# Patient Record
Sex: FEMALE | Race: WHITE
[De-identification: names, ages, dates, MRNs, and addresses within clinical notes are randomized per-mention and may not be internally consistent; named-entity substitution may affect disease eponyms.]

---

## 2018-06-13 ENCOUNTER — HOSPITAL ENCOUNTER (EMERGENCY)
Dept: HOSPITAL 87 - ER | Age: 23
Discharge: LEFT BEFORE DISCHARGE | End: 2018-06-13
Payer: SELF-PAY

## 2018-06-13 VITALS — HEIGHT: 62 IN | WEIGHT: 141.1 LBS | BODY MASS INDEX: 25.96 KG/M2

## 2018-06-13 VITALS — SYSTOLIC BLOOD PRESSURE: 120 MMHG | DIASTOLIC BLOOD PRESSURE: 77 MMHG

## 2018-06-13 DIAGNOSIS — Z88.0: ICD-10-CM

## 2018-06-13 DIAGNOSIS — F17.200: ICD-10-CM

## 2018-06-13 DIAGNOSIS — Z53.21: Primary | ICD-10-CM

## 2018-07-16 ENCOUNTER — HOSPITAL ENCOUNTER (EMERGENCY)
Dept: HOSPITAL 72 - EMR | Age: 23
Discharge: HOME | End: 2018-07-16
Payer: SELF-PAY

## 2018-07-16 VITALS — WEIGHT: 135 LBS | BODY MASS INDEX: 24.84 KG/M2 | HEIGHT: 62 IN

## 2018-07-16 VITALS — SYSTOLIC BLOOD PRESSURE: 143 MMHG | DIASTOLIC BLOOD PRESSURE: 99 MMHG

## 2018-07-16 DIAGNOSIS — Z23: ICD-10-CM
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
## 2018-07-16 DIAGNOSIS — S41.011A: Primary | ICD-10-CM

## 2018-07-16 DIAGNOSIS — S21.119A: ICD-10-CM

## 2018-07-16 DIAGNOSIS — S51.012A: ICD-10-CM
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
## 2018-07-16 DIAGNOSIS — S81.812A: ICD-10-CM

## 2018-07-16 DIAGNOSIS — Z88.0: ICD-10-CM

## 2018-07-16 DIAGNOSIS — S11.91XA: ICD-10-CM

## 2018-07-16 PROCEDURE — 90715 TDAP VACCINE 7 YRS/> IM: CPT

## 2018-07-16 PROCEDURE — 99284 EMERGENCY DEPT VISIT MOD MDM: CPT

## 2018-07-16 PROCEDURE — 90471 IMMUNIZATION ADMIN: CPT
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
## 2018-07-23 ENCOUNTER — HOSPITAL ENCOUNTER (EMERGENCY)
Dept: HOSPITAL 72 - EMR | Age: 23
LOS: 1 days | Discharge: HOME | End: 2018-07-24
Payer: SELF-PAY

## 2018-07-23 VITALS — HEIGHT: 62 IN | BODY MASS INDEX: 24.84 KG/M2 | WEIGHT: 135 LBS

## 2018-07-23 VITALS — DIASTOLIC BLOOD PRESSURE: 75 MMHG | SYSTOLIC BLOOD PRESSURE: 120 MMHG

## 2018-07-23 DIAGNOSIS — Z48.02: ICD-10-CM

## 2018-07-23 DIAGNOSIS — Z88.0: ICD-10-CM

## 2018-07-23 DIAGNOSIS — N39.0: Primary | ICD-10-CM

## 2018-07-23 LAB
APPEARANCE UR: CLEAR
APTT PPP: (no result) S
GLUCOSE UR STRIP-MCNC: NEGATIVE MG/DL
KETONES UR QL STRIP: NEGATIVE
LEUKOCYTE ESTERASE UR QL STRIP: (no result)
NITRITE UR QL STRIP: NEGATIVE
PH UR STRIP: 8 [PH] (ref 4.5–8)
PROT UR QL STRIP: (no result)
SP GR UR STRIP: 1.01 (ref 1–1.03)
UROBILINOGEN UR-MCNC: 1 MG/DL (ref 0–1)

## 2018-07-23 PROCEDURE — 81025 URINE PREGNANCY TEST: CPT

## 2018-07-23 PROCEDURE — 81003 URINALYSIS AUTO W/O SCOPE: CPT

## 2018-07-23 PROCEDURE — 87181 SC STD AGAR DILUTION PER AGT: CPT

## 2018-07-23 PROCEDURE — 99283 EMERGENCY DEPT VISIT LOW MDM: CPT

## 2018-07-23 PROCEDURE — 87086 URINE CULTURE/COLONY COUNT: CPT
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
## 2018-07-24 VITALS — SYSTOLIC BLOOD PRESSURE: 118 MMHG | DIASTOLIC BLOOD PRESSURE: 60 MMHG

## 2018-08-12 ENCOUNTER — HOSPITAL ENCOUNTER (EMERGENCY)
Dept: HOSPITAL 72 - EMR | Age: 23
Discharge: HOME | End: 2018-08-12
Payer: SELF-PAY

## 2018-08-12 VITALS — HEIGHT: 62 IN | WEIGHT: 135 LBS | BODY MASS INDEX: 24.84 KG/M2

## 2018-08-12 VITALS — DIASTOLIC BLOOD PRESSURE: 84 MMHG | SYSTOLIC BLOOD PRESSURE: 134 MMHG

## 2018-08-12 DIAGNOSIS — Z88.0: ICD-10-CM

## 2018-08-12 DIAGNOSIS — T76.21XA: Primary | ICD-10-CM

## 2018-08-12 DIAGNOSIS — Z88.1: ICD-10-CM

## 2018-08-12 DIAGNOSIS — A64: ICD-10-CM

## 2018-08-12 LAB
ADD MANUAL DIFF: NO
ALBUMIN SERPL-MCNC: 4.9 G/DL (ref 3.4–5)
ALBUMIN/GLOB SERPL: 1.2 {RATIO} (ref 1–2.7)
ALP SERPL-CCNC: 80 U/L (ref 46–116)
ALT SERPL-CCNC: 18 U/L (ref 12–78)
ANION GAP SERPL CALC-SCNC: 14 MMOL/L (ref 5–15)
APPEARANCE UR: CLEAR
APTT PPP: YELLOW S
AST SERPL-CCNC: 18 U/L (ref 15–37)
BASOPHILS NFR BLD AUTO: 1.4 % (ref 0–2)
BILIRUB DIRECT SERPL-MCNC: 0.4 MG/DL (ref 0–0.3)
BILIRUB SERPL-MCNC: 2.8 MG/DL (ref 0.2–1)
BUN SERPL-MCNC: 10 MG/DL (ref 7–18)
CALCIUM SERPL-MCNC: 10.5 MG/DL (ref 8.5–10.1)
CHLORIDE SERPL-SCNC: 100 MMOL/L (ref 98–107)
CO2 SERPL-SCNC: 27 MMOL/L (ref 21–32)
CREAT SERPL-MCNC: 1 MG/DL (ref 0.55–1.3)
EOSINOPHIL NFR BLD AUTO: 1.4 % (ref 0–3)
ERYTHROCYTE [DISTWIDTH] IN BLOOD BY AUTOMATED COUNT: 11.5 % (ref 11.6–14.8)
GLOBULIN SER-MCNC: 4 G/DL
GLUCOSE UR STRIP-MCNC: NEGATIVE MG/DL
HCT VFR BLD CALC: 46.6 % (ref 37–47)
HGB BLD-MCNC: 16.2 G/DL (ref 12–16)
KETONES UR QL STRIP: NEGATIVE
LEUKOCYTE ESTERASE UR QL STRIP: (no result)
LYMPHOCYTES NFR BLD AUTO: 39.6 % (ref 20–45)
MCV RBC AUTO: 92 FL (ref 80–99)
MONOCYTES NFR BLD AUTO: 8 % (ref 1–10)
NEUTROPHILS NFR BLD AUTO: 49.7 % (ref 45–75)
NITRITE UR QL STRIP: NEGATIVE
PH UR STRIP: 7 [PH] (ref 4.5–8)
PLATELET # BLD: 353 K/UL (ref 150–450)
POTASSIUM SERPL-SCNC: 3.8 MMOL/L (ref 3.5–5.1)
PROT UR QL STRIP: (no result)
RBC # BLD AUTO: 5.09 M/UL (ref 4.2–5.4)
SODIUM SERPL-SCNC: 141 MMOL/L (ref 136–145)
SP GR UR STRIP: 1.01 (ref 1–1.03)
UROBILINOGEN UR-MCNC: 8 MG/DL (ref 0–1)
WBC # BLD AUTO: 8.9 K/UL (ref 4.8–10.8)

## 2018-08-12 PROCEDURE — 81025 URINE PREGNANCY TEST: CPT

## 2018-08-12 PROCEDURE — 81003 URINALYSIS AUTO W/O SCOPE: CPT

## 2018-08-12 PROCEDURE — 83690 ASSAY OF LIPASE: CPT

## 2018-08-12 PROCEDURE — 82248 BILIRUBIN DIRECT: CPT

## 2018-08-12 PROCEDURE — 36415 COLL VENOUS BLD VENIPUNCTURE: CPT

## 2018-08-12 PROCEDURE — 85025 COMPLETE CBC W/AUTO DIFF WBC: CPT

## 2018-08-12 PROCEDURE — 99284 EMERGENCY DEPT VISIT MOD MDM: CPT

## 2018-08-12 PROCEDURE — 80053 COMPREHEN METABOLIC PANEL: CPT

## 2018-08-12 PROCEDURE — 70450 CT HEAD/BRAIN W/O DYE: CPT
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
## 2018-08-12 NOTE — DIAGNOSTIC IMAGING REPORT
EXAM:

  CT Head Without Intravenous Contrast

 

CLINICAL HISTORY:

  AMS

 

TECHNIQUE:

  Axial computed tomography images of the head/brain without intravenous 

contrast.  CTDI is 70.38 mGy and DLP is 1293 mGy-cm.  One or more of the 

following dose reduction techniques were used: automated exposure control,

 adjustment of the mA and/or kV according to patient size, use of 

iterative reconstruction technique.

 

COMPARISON:

  No relevant prior studies available.

 

FINDINGS:

  Brain:  Unremarkable.  No hemorrhage.  No significant white matter 

disease.  No edema.

  Ventricles:  Unremarkable.  No ventriculomegaly.

  Bones/joints:  Unremarkable.  No acute fracture.

  Soft tissues:  Unremarkable.

  Sinuses:  Unremarkable as visualized.  No acute sinusitis.

  Mastoid air cells:  Unremarkable as visualized.  No mastoid effusion.

 

IMPRESSION:     

  Normal head/brain CT.

## 2018-12-12 ENCOUNTER — HOSPITAL ENCOUNTER (EMERGENCY)
Dept: HOSPITAL 72 - EMR | Age: 23
Discharge: HOME | End: 2018-12-12
Payer: SELF-PAY

## 2018-12-12 VITALS — SYSTOLIC BLOOD PRESSURE: 126 MMHG | DIASTOLIC BLOOD PRESSURE: 76 MMHG

## 2018-12-12 VITALS — SYSTOLIC BLOOD PRESSURE: 137 MMHG | DIASTOLIC BLOOD PRESSURE: 81 MMHG

## 2018-12-12 VITALS — HEIGHT: 62 IN | WEIGHT: 120 LBS | BODY MASS INDEX: 22.08 KG/M2

## 2018-12-12 DIAGNOSIS — Z88.8: ICD-10-CM

## 2018-12-12 DIAGNOSIS — R10.2: Primary | ICD-10-CM

## 2018-12-12 DIAGNOSIS — F17.200: ICD-10-CM

## 2018-12-12 DIAGNOSIS — N30.00: ICD-10-CM

## 2018-12-12 DIAGNOSIS — Z88.0: ICD-10-CM

## 2018-12-12 LAB
APPEARANCE UR: (no result)
APTT PPP: COLORLESS S
GLUCOSE UR STRIP-MCNC: NEGATIVE MG/DL
KETONES UR QL STRIP: NEGATIVE
LEUKOCYTE ESTERASE UR QL STRIP: (no result)
NITRITE UR QL STRIP: NEGATIVE
PH UR STRIP: 6.5 [PH] (ref 4.5–8)
PROT UR QL STRIP: NEGATIVE
SP GR UR STRIP: 1 (ref 1–1.03)
UROBILINOGEN UR-MCNC: NORMAL MG/DL (ref 0–1)

## 2018-12-12 PROCEDURE — 86850 RBC ANTIBODY SCREEN: CPT

## 2018-12-12 PROCEDURE — 36415 COLL VENOUS BLD VENIPUNCTURE: CPT

## 2018-12-12 PROCEDURE — 86900 BLOOD TYPING SEROLOGIC ABO: CPT

## 2018-12-12 PROCEDURE — 99283 EMERGENCY DEPT VISIT LOW MDM: CPT

## 2018-12-12 PROCEDURE — 81025 URINE PREGNANCY TEST: CPT

## 2018-12-12 PROCEDURE — 80307 DRUG TEST PRSMV CHEM ANLYZR: CPT

## 2018-12-12 PROCEDURE — 81003 URINALYSIS AUTO W/O SCOPE: CPT

## 2018-12-12 PROCEDURE — 86901 BLOOD TYPING SEROLOGIC RH(D): CPT

## 2018-12-12 PROCEDURE — 87086 URINE CULTURE/COLONY COUNT: CPT

## 2018-12-12 NOTE — EMERGENCY ROOM REPORT
History of Present Illness


General


Chief Complaint:  Pregnancy Complications


Source:  Patient





Present Illness


HPI


This 23-year-old female came in complaining of cramping and spotting.  She said 

she is pregnant.  She claimed that she just got discharged from a correctional 

facility and was told that she was pregnant.  She started spotting today.  No 

fever chills.  No nausea no vomiting.  Cramping in nature.  No urinary 

complaint.  Nothing made it better.  Nothing made it worse


Allergies:  


Coded Allergies:  


     AMOXICILLIN (Verified  Allergy, Unknown, 18)


     DOXYCYCLINE (Verified  Allergy, Unknown, 18)


     PENICILLINS (Verified  Allergy, Unknown, 18)





Patient History


Past Medical History:  see triage record, old chart reviewed


Past Surgical History:  none


Pertinent Family History:  none


Social History:  Reports: smoking


Last Menstrual Period:  18


Pregnant Now:  Yes


:  1


Para:  0


Immunizations:  other


Reviewed Nursing Documentation:  PMH: Agreed; PSxH: Agreed





Nursing Documentation-PMH


Past Medical History:  No Stated History





Review of Systems


Eye:  Denies: eye pain, blurred vision


ENT:  Denies: ear pain, nose congestion, throat swelling


Respiratory:  Denies: cough, shortness of breath


Cardiovascular:  Denies: chest pain, palpitations


Gastrointestinal:  Denies: abdominal pain, diarrhea, nausea, vomiting


Musculoskeletal:  Denies: back pain, joint pain


Skin:  Denies: rash


Neurological:  Denies: headache, numbness


Endocrine:  Denies: increased thirst, increased urine


Hematologic/Lymphatic:  Denies: easy bruising


All Other Systems:  negative except mentioned in HPI





Physical Exam





Vital Signs








  Date Time  Temp Pulse Resp B/P (MAP) Pulse Ox O2 Delivery O2 Flow Rate FiO2


 


18 20:43 97.9 112 12 141/87 99   





vitals normal except for tachycardia


Sp02 EP Interpretation:  reviewed, normal


General Appearance:  well appearing, no apparent distress, alert


Head:  normocephalic, atraumatic


Eyes:  bilateral eye PERRL, bilateral eye EOMI


ENT:  hearing grossly normal, normal pharynx


Neck:  full range of motion, supple, no meningismus


Respiratory:  chest non-tender, lungs clear, normal breath sounds


Cardiovascular #1:  regular rate, rhythm, no murmur


Gastrointestinal:  normal bowel sounds, non tender, no mass, no organomegaly, 

no bruit, non-distended


Musculoskeletal:  back normal, gait/station normal, normal range of motion


Neurologic:  alert, oriented x3


Psychiatric:  anxious


Skin:  warm/dry





Medical Decision Making


Diagnostic Impression:  


 Primary Impression:  


 Pelvic cramping


 Additional Impression:  


 UTI (urinary tract infection)


 Qualified Codes:  N30.00 - Acute cystitis without hematuria


ER Course


Patient presents with spotting.  No evidence of pregnancy here.  She may have 

at the start of her menstrual.  She does have a urinary tract infection.  We'll 

treat with antibiotics.  Urine culture in the past grew out Escherichia coli.  

Abdomen exam is soft.  No guarding or rebound.  No right lower quadrant pain.  

I see no need for ultrasound or blood work at this moment in time.  We'll 

discharge home.





Last Vital Signs








  Date Time  Temp Pulse Resp B/P (MAP) Pulse Ox O2 Delivery O2 Flow Rate FiO2


 


18 20:43 97.9 112 12 141/87 99   








Status:  improved


Disposition:  HOME, SELF-CARE


Condition:  Stable


Scripts


Nitrofurantoin Monohyd/M-Cryst (Nitrofurantoin Mono-Mcr 100 mg) 100 Mg Capsule


100 MG ORAL Q12H, #14 CAP


   Prov: Jamaal Maciel MD         18 


Ibuprofen* (MOTRIN*) 600 Mg Tablet


600 MG ORAL THREE TIMES A DAY, #30 TAB 0 Refills


   Prov: Jamaal Maciel MD         18





Additional Instructions:  


Follow-up with your doctor in 7 days.  Return if worse.











Jamaal Maciel MD Dec 12, 2018 21:31